# Patient Record
Sex: FEMALE | Race: WHITE
[De-identification: names, ages, dates, MRNs, and addresses within clinical notes are randomized per-mention and may not be internally consistent; named-entity substitution may affect disease eponyms.]

---

## 2021-03-24 ENCOUNTER — HOSPITAL ENCOUNTER (EMERGENCY)
Dept: HOSPITAL 75 - ER FS | Age: 21
LOS: 1 days | Discharge: ANOTHER HOSPITAL | End: 2021-03-25
Payer: SELF-PAY

## 2021-03-24 VITALS — BODY MASS INDEX: 19.94 KG/M2 | WEIGHT: 88.63 LBS | HEIGHT: 55.98 IN

## 2021-03-24 DIAGNOSIS — Z20.822: ICD-10-CM

## 2021-03-24 DIAGNOSIS — R45.851: Primary | ICD-10-CM

## 2021-03-24 LAB
ALBUMIN SERPL-MCNC: 4.2 GM/DL (ref 3.2–4.5)
ALP SERPL-CCNC: 64 U/L (ref 40–136)
ALT SERPL-CCNC: 7 U/L (ref 0–55)
APAP SERPL-MCNC: < 10 UG/ML (ref 10–30)
APTT PPP: YELLOW S
BACTERIA #/AREA URNS HPF: (no result) /HPF
BARBITURATES UR QL: NEGATIVE
BASOPHILS # BLD AUTO: 0.1 10^3/UL (ref 0–0.1)
BASOPHILS NFR BLD AUTO: 1 % (ref 0–10)
BENZODIAZ UR QL SCN: NEGATIVE
BILIRUB SERPL-MCNC: 0.4 MG/DL (ref 0.1–1)
BILIRUB UR QL STRIP: NEGATIVE
BUN/CREAT SERPL: 18
CALCIUM SERPL-MCNC: 8.8 MG/DL (ref 8.5–10.1)
CHLORIDE SERPL-SCNC: 110 MMOL/L (ref 98–107)
CO2 SERPL-SCNC: 23 MMOL/L (ref 21–32)
COCAINE UR QL: NEGATIVE
CREAT SERPL-MCNC: 0.62 MG/DL (ref 0.6–1.3)
EOSINOPHIL # BLD AUTO: 0.2 10^3/UL (ref 0–0.3)
EOSINOPHIL NFR BLD AUTO: 3 % (ref 0–10)
FIBRINOGEN PPP-MCNC: (no result) MG/DL
GFR SERPLBLD BASED ON 1.73 SQ M-ARVRAT: > 60 ML/MIN
GLUCOSE SERPL-MCNC: 85 MG/DL (ref 70–105)
GLUCOSE UR STRIP-MCNC: NEGATIVE MG/DL
HCG UR QL: NEGATIVE
HCT VFR BLD CALC: 35 % (ref 35–52)
HGB BLD-MCNC: 12.3 G/DL (ref 11.5–16)
KETONES UR QL STRIP: NEGATIVE
LEUKOCYTE ESTERASE UR QL STRIP: (no result)
LYMPHOCYTES # BLD AUTO: 1.7 X 10^3 (ref 1–4)
LYMPHOCYTES NFR BLD AUTO: 26 % (ref 12–44)
MANUAL DIFFERENTIAL PERFORMED BLD QL: NO
MCH RBC QN AUTO: 32 PG (ref 25–34)
MCHC RBC AUTO-ENTMCNC: 35 G/DL (ref 32–36)
MCV RBC AUTO: 91 FL (ref 80–99)
METHADONE UR QL SCN: NEGATIVE
METHAMPHETAMINE SCREEN URINE S: NEGATIVE
MONOCYTES # BLD AUTO: 0.4 X 10^3 (ref 0–1)
MONOCYTES NFR BLD AUTO: 7 % (ref 0–12)
NEUTROPHILS # BLD AUTO: 4.1 X 10^3 (ref 1.8–7.8)
NEUTROPHILS NFR BLD AUTO: 64 % (ref 42–75)
NITRITE UR QL STRIP: NEGATIVE
OPIATES UR QL SCN: NEGATIVE
OXYCODONE UR QL: NEGATIVE
PH UR STRIP: 8.5 [PH] (ref 5–9)
PLATELET # BLD: 276 10^3/UL (ref 130–400)
PMV BLD AUTO: 10 FL (ref 7.4–10.4)
POTASSIUM SERPL-SCNC: 3.9 MMOL/L (ref 3.6–5)
PROPOXYPH UR QL: NEGATIVE
PROT SERPL-MCNC: 6.5 GM/DL (ref 6.4–8.2)
PROT UR QL STRIP: (no result)
RBC #/AREA URNS HPF: (no result) /HPF
SALICYLATES SERPL-MCNC: < 0.3 MG/DL (ref 5–20)
SODIUM SERPL-SCNC: 142 MMOL/L (ref 135–145)
SP GR UR STRIP: 1.02 (ref 1.02–1.02)
TRICYCLICS UR QL SCN: NEGATIVE
WBC # BLD AUTO: 6.4 10^3/UL (ref 4.3–11)

## 2021-03-24 PROCEDURE — 81000 URINALYSIS NONAUTO W/SCOPE: CPT

## 2021-03-24 PROCEDURE — 80053 COMPREHEN METABOLIC PANEL: CPT

## 2021-03-24 PROCEDURE — 93005 ELECTROCARDIOGRAM TRACING: CPT

## 2021-03-24 PROCEDURE — 85025 COMPLETE CBC W/AUTO DIFF WBC: CPT

## 2021-03-24 PROCEDURE — 87088 URINE BACTERIA CULTURE: CPT

## 2021-03-24 PROCEDURE — 80320 DRUG SCREEN QUANTALCOHOLS: CPT

## 2021-03-24 PROCEDURE — 87635 SARS-COV-2 COVID-19 AMP PRB: CPT

## 2021-03-24 PROCEDURE — 93041 RHYTHM ECG TRACING: CPT

## 2021-03-24 PROCEDURE — 80329 ANALGESICS NON-OPIOID 1 OR 2: CPT

## 2021-03-24 PROCEDURE — 36415 COLL VENOUS BLD VENIPUNCTURE: CPT

## 2021-03-24 PROCEDURE — 99284 EMERGENCY DEPT VISIT MOD MDM: CPT

## 2021-03-24 PROCEDURE — 80306 DRUG TEST PRSMV INSTRMNT: CPT

## 2021-03-24 PROCEDURE — 84703 CHORIONIC GONADOTROPIN ASSAY: CPT
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
## 2021-03-25 VITALS — SYSTOLIC BLOOD PRESSURE: 116 MMHG | DIASTOLIC BLOOD PRESSURE: 72 MMHG

## 2021-04-07 ENCOUNTER — HOSPITAL ENCOUNTER (EMERGENCY)
Dept: HOSPITAL 75 - ER FS | Age: 21
Discharge: HOME | End: 2021-04-07
Payer: SELF-PAY

## 2021-04-07 VITALS — SYSTOLIC BLOOD PRESSURE: 124 MMHG | DIASTOLIC BLOOD PRESSURE: 62 MMHG

## 2021-04-07 VITALS — BODY MASS INDEX: 19.98 KG/M2 | WEIGHT: 92.59 LBS | HEIGHT: 57.09 IN

## 2021-04-07 DIAGNOSIS — T43.222A: ICD-10-CM

## 2021-04-07 DIAGNOSIS — F41.9: Primary | ICD-10-CM

## 2021-04-07 LAB
ALBUMIN SERPL-MCNC: 3.7 GM/DL (ref 3.2–4.5)
ALP SERPL-CCNC: 64 U/L (ref 40–136)
ALT SERPL-CCNC: 6 U/L (ref 0–55)
APAP SERPL-MCNC: < 10 UG/ML (ref 10–30)
APTT PPP: YELLOW S
BACTERIA #/AREA URNS HPF: (no result) /HPF
BARBITURATES UR QL: NEGATIVE
BASOPHILS # BLD AUTO: 0 10^3/UL (ref 0–0.1)
BASOPHILS NFR BLD AUTO: 0 % (ref 0–10)
BENZODIAZ UR QL SCN: NEGATIVE
BILIRUB SERPL-MCNC: 0.2 MG/DL (ref 0.1–1)
BILIRUB UR QL STRIP: NEGATIVE
BUN/CREAT SERPL: 17
CALCIUM SERPL-MCNC: 8.9 MG/DL (ref 8.5–10.1)
CHLORIDE SERPL-SCNC: 108 MMOL/L (ref 98–107)
CO2 SERPL-SCNC: 23 MMOL/L (ref 21–32)
COCAINE UR QL: NEGATIVE
CREAT SERPL-MCNC: 0.69 MG/DL (ref 0.6–1.3)
EOSINOPHIL # BLD AUTO: 0.2 10^3/UL (ref 0–0.3)
EOSINOPHIL NFR BLD AUTO: 3 % (ref 0–10)
FIBRINOGEN PPP-MCNC: CLEAR MG/DL
GFR SERPLBLD BASED ON 1.73 SQ M-ARVRAT: > 60 ML/MIN
GLUCOSE SERPL-MCNC: 88 MG/DL (ref 70–105)
GLUCOSE UR STRIP-MCNC: NEGATIVE MG/DL
HCT VFR BLD CALC: 35 % (ref 35–52)
HGB BLD-MCNC: 12.4 G/DL (ref 11.5–16)
KETONES UR QL STRIP: NEGATIVE
LEUKOCYTE ESTERASE UR QL STRIP: (no result)
LYMPHOCYTES # BLD AUTO: 1.3 X 10^3 (ref 1–4)
LYMPHOCYTES NFR BLD AUTO: 16 % (ref 12–44)
MANUAL DIFFERENTIAL PERFORMED BLD QL: NO
MCH RBC QN AUTO: 32 PG (ref 25–34)
MCHC RBC AUTO-ENTMCNC: 35 G/DL (ref 32–36)
MCV RBC AUTO: 92 FL (ref 80–99)
METHADONE UR QL SCN: NEGATIVE
METHAMPHETAMINE SCREEN URINE S: NEGATIVE
MONOCYTES # BLD AUTO: 0.7 X 10^3 (ref 0–1)
MONOCYTES NFR BLD AUTO: 9 % (ref 0–12)
NEUTROPHILS # BLD AUTO: 5.8 X 10^3 (ref 1.8–7.8)
NEUTROPHILS NFR BLD AUTO: 72 % (ref 42–75)
NITRITE UR QL STRIP: NEGATIVE
OPIATES UR QL SCN: NEGATIVE
OXYCODONE UR QL: NEGATIVE
PH UR STRIP: 6 [PH] (ref 5–9)
PLATELET # BLD: 227 10^3/UL (ref 130–400)
PMV BLD AUTO: 10.1 FL (ref 7.4–10.4)
POTASSIUM SERPL-SCNC: 3.9 MMOL/L (ref 3.6–5)
PROPOXYPH UR QL: NEGATIVE
PROT SERPL-MCNC: 6.3 GM/DL (ref 6.4–8.2)
PROT UR QL STRIP: (no result)
RBC #/AREA URNS HPF: (no result) /HPF
SALICYLATES SERPL-MCNC: < 0.3 MG/DL (ref 5–20)
SODIUM SERPL-SCNC: 140 MMOL/L (ref 135–145)
SP GR UR STRIP: 1.02 (ref 1.02–1.02)
SQUAMOUS #/AREA URNS HPF: (no result) /HPF
TRICYCLICS UR QL SCN: NEGATIVE
WBC # BLD AUTO: 8.1 10^3/UL (ref 4.3–11)
WBC #/AREA URNS HPF: (no result) /HPF

## 2021-04-07 PROCEDURE — 80320 DRUG SCREEN QUANTALCOHOLS: CPT

## 2021-04-07 PROCEDURE — 81000 URINALYSIS NONAUTO W/SCOPE: CPT

## 2021-04-07 PROCEDURE — 93005 ELECTROCARDIOGRAM TRACING: CPT

## 2021-04-07 PROCEDURE — 84703 CHORIONIC GONADOTROPIN ASSAY: CPT

## 2021-04-07 PROCEDURE — 80329 ANALGESICS NON-OPIOID 1 OR 2: CPT

## 2021-04-07 PROCEDURE — 93041 RHYTHM ECG TRACING: CPT

## 2021-04-07 PROCEDURE — 80053 COMPREHEN METABOLIC PANEL: CPT

## 2021-04-07 PROCEDURE — 80306 DRUG TEST PRSMV INSTRMNT: CPT

## 2021-04-07 PROCEDURE — 36415 COLL VENOUS BLD VENIPUNCTURE: CPT

## 2021-04-07 PROCEDURE — 99284 EMERGENCY DEPT VISIT MOD MDM: CPT

## 2021-04-07 PROCEDURE — 85025 COMPLETE CBC W/AUTO DIFF WBC: CPT
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
## 2021-08-23 ENCOUNTER — HOSPITAL ENCOUNTER (EMERGENCY)
Dept: HOSPITAL 75 - ER FS | Age: 21
LOS: 1 days | Discharge: PSYCHIATRIC OR SUBSTANCE USE FACILITY | End: 2021-08-24
Payer: SELF-PAY

## 2021-08-23 ENCOUNTER — HOSPITAL ENCOUNTER (EMERGENCY)
Dept: HOSPITAL 75 - ER FS | Age: 21
LOS: 31 days | Discharge: LEFT BEFORE DISCHARGE | End: 2021-09-23
Payer: COMMERCIAL

## 2021-08-23 VITALS — HEIGHT: 57.48 IN | WEIGHT: 93.26 LBS | BODY MASS INDEX: 19.84 KG/M2

## 2021-08-23 DIAGNOSIS — F40.10: Primary | ICD-10-CM

## 2021-08-23 DIAGNOSIS — F32.9: ICD-10-CM

## 2021-08-23 DIAGNOSIS — R45.851: Primary | ICD-10-CM

## 2021-08-23 DIAGNOSIS — Z20.822: ICD-10-CM

## 2021-08-23 LAB
ALBUMIN SERPL-MCNC: 4.4 GM/DL (ref 3.2–4.5)
ALP SERPL-CCNC: 70 U/L (ref 40–136)
ALT SERPL-CCNC: < 5 U/L (ref 0–55)
APAP SERPL-MCNC: < 10 UG/ML (ref 10–30)
APTT PPP: YELLOW S
BACTERIA #/AREA URNS HPF: NEGATIVE /HPF
BARBITURATES UR QL: NEGATIVE
BASOPHILS # BLD AUTO: 0.1 10^3/UL (ref 0–0.1)
BASOPHILS NFR BLD AUTO: 1 % (ref 0–10)
BENZODIAZ UR QL SCN: NEGATIVE
BILIRUB SERPL-MCNC: 0.5 MG/DL (ref 0.1–1)
BILIRUB UR QL STRIP: NEGATIVE
BUN/CREAT SERPL: 17
CALCIUM SERPL-MCNC: 9.3 MG/DL (ref 8.5–10.1)
CHLORIDE SERPL-SCNC: 104 MMOL/L (ref 98–107)
CO2 SERPL-SCNC: 23 MMOL/L (ref 21–32)
COCAINE UR QL: NEGATIVE
CREAT SERPL-MCNC: 0.69 MG/DL (ref 0.6–1.3)
EOSINOPHIL # BLD AUTO: 0.3 10^3/UL (ref 0–0.3)
EOSINOPHIL NFR BLD AUTO: 3 % (ref 0–10)
FIBRINOGEN PPP-MCNC: CLEAR MG/DL
GFR SERPLBLD BASED ON 1.73 SQ M-ARVRAT: 107 ML/MIN
GLUCOSE SERPL-MCNC: 88 MG/DL (ref 70–105)
GLUCOSE UR STRIP-MCNC: NEGATIVE MG/DL
HCG UR QL: NEGATIVE
HCT VFR BLD CALC: 38 % (ref 35–52)
HGB BLD-MCNC: 13.2 G/DL (ref 11.5–16)
KETONES UR QL STRIP: NEGATIVE
LEUKOCYTE ESTERASE UR QL STRIP: (no result)
LYMPHOCYTES # BLD AUTO: 2.6 X 10^3 (ref 1–4)
LYMPHOCYTES NFR BLD AUTO: 24 % (ref 12–44)
MANUAL DIFFERENTIAL PERFORMED BLD QL: NO
MCH RBC QN AUTO: 31 PG (ref 25–34)
MCHC RBC AUTO-ENTMCNC: 35 G/DL (ref 32–36)
MCV RBC AUTO: 91 FL (ref 80–99)
METHADONE UR QL SCN: NEGATIVE
METHAMPHETAMINE SCREEN URINE S: NEGATIVE
MONOCYTES # BLD AUTO: 0.6 X 10^3 (ref 0–1)
MONOCYTES NFR BLD AUTO: 6 % (ref 0–12)
NEUTROPHILS # BLD AUTO: 7.2 X 10^3 (ref 1.8–7.8)
NEUTROPHILS NFR BLD AUTO: 67 % (ref 42–75)
NITRITE UR QL STRIP: NEGATIVE
OPIATES UR QL SCN: NEGATIVE
OXYCODONE UR QL: NEGATIVE
PH UR STRIP: 7 [PH] (ref 5–9)
PLATELET # BLD: 287 10^3/UL (ref 130–400)
PMV BLD AUTO: 10.2 FL (ref 9–12.2)
POTASSIUM SERPL-SCNC: 4 MMOL/L (ref 3.6–5)
PROPOXYPH UR QL: NEGATIVE
PROT SERPL-MCNC: 7.2 GM/DL (ref 6.4–8.2)
PROT UR QL STRIP: (no result)
RBC #/AREA URNS HPF: (no result) /HPF
SALICYLATES SERPL-MCNC: < 0.3 MG/DL (ref 5–20)
SODIUM SERPL-SCNC: 137 MMOL/L (ref 135–145)
SP GR UR STRIP: 1.02 (ref 1.02–1.02)
SQUAMOUS #/AREA URNS HPF: (no result) /HPF
TRICYCLICS UR QL SCN: NEGATIVE
WBC # BLD AUTO: 10.8 10^3/UL (ref 4.3–11)
WBC #/AREA URNS HPF: (no result) /HPF

## 2021-08-23 PROCEDURE — 80053 COMPREHEN METABOLIC PANEL: CPT

## 2021-08-23 PROCEDURE — 80320 DRUG SCREEN QUANTALCOHOLS: CPT

## 2021-08-23 PROCEDURE — 81000 URINALYSIS NONAUTO W/SCOPE: CPT

## 2021-08-23 PROCEDURE — 36415 COLL VENOUS BLD VENIPUNCTURE: CPT

## 2021-08-23 PROCEDURE — 87636 SARSCOV2 & INF A&B AMP PRB: CPT

## 2021-08-23 PROCEDURE — 80329 ANALGESICS NON-OPIOID 1 OR 2: CPT

## 2021-08-23 PROCEDURE — 80306 DRUG TEST PRSMV INSTRMNT: CPT

## 2021-08-23 PROCEDURE — 84703 CHORIONIC GONADOTROPIN ASSAY: CPT

## 2021-08-23 PROCEDURE — 85025 COMPLETE CBC W/AUTO DIFF WBC: CPT

## 2021-08-23 PROCEDURE — 99284 EMERGENCY DEPT VISIT MOD MDM: CPT
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
## 2021-08-24 VITALS — SYSTOLIC BLOOD PRESSURE: 112 MMHG | DIASTOLIC BLOOD PRESSURE: 62 MMHG

## 2021-10-17 NOTE — XMS REPORT
Encounter Summary

                             Created on: 10/17/2021



Nellie Rubin

External Reference #: [id number]

: 2000

Sex: Female



Demographics





                          Address                   [address] [place]  [address]

 

                          Home Phone                [phone]

 

                          Preferred Language        English

 

                          Marital Status            Single

 

                          Spiritism Affiliation     Unknown

 

                          Race                      White

 

                          Ethnic Group              Not  or 





Author





                          Author                    [facility]

 

                          Organization              [facility]

 

                          Address                   Unknown

 

                          Phone                     Unavailable







Support





                Name            Relationship    Address         Phone

 

                    Juana Rubin         [employer]                [address] [place]  [address]                   [phone]







Care Team Providers





                    Care Team Member Name Role                Phone

 

                    Unassigned, None    PCP                 Unavailable

 

                    Kitty Merino  [phone]           [phone]







Encounter Details





                          Care Team                 Description



                     Date                Type                Department  

 

                                        



Kitty Merino



[phone] (Work)                      



                     2021          Patient             [facility] 

 



                           Outreach                  Management  



                                         [address] [place] [address]  



                                         [phone]  







Social History





                                        Date



                 Tobacco Use     Types           Packs/Day       Years Used 

 

                                         



                                         Never Smoker    

 

    



                                         Smokeless Tobacco: Never   



                                         Used   







                                        Comments



                           Alcohol Use               Standard Drinks/Week 

 

                                        occasional use



                           Yes                       0 (1 standard drink = 0.6 o

z pure alcohol) 







  



                     Alcohol Habits      Answer              Date Recorded

 

  



                     How often do you have a drink containing alcohol?  2-4 time

s a month   

2021

 

  



                     How many drinks containing alcohol do you have on  3 or 4  

            2021



                                         a typical day when you are drinking?  

 

  



                     How often do you have six or more drinks on one  Monthly   

          2021



                                         occasion?  







  



                     Social Isolation    Answer              Date Recorded

 

  



                     In a typical week, how many times do you talk on  Never    

           2021



                                         the phone with family, friends, or neig

hbors?  

 

  



                     How often do you get together with friends or  Once a week 

        2021



                                         relatives?  

 

  



                     How often do you attend Adventism or Buddhism  Never         

      2021



                                         services?  

 

  



                     Do you belong to any clubs or organizations such  No       

           2021



                                         as Adventism groups, unions, fraternal or 

athletic  



                                         groups, or school groups?  

 

  



                     How often do you attend meetings of the clubs or  Never    

           2021



                                         organizations you belong to?  

 

  



                     Are you now , , , ,  Never m

arried       2021



                                         never  or living with a partner?

  







  



                     Physical Activity   Answer              Date Recorded

 

  



                     On average, how many days per week do you engage  4 days   

           2021



                                         in moderate to strenuous exercise (like

 walking  



                                         fast, running, jogging, dancing, swimmi

ng, biking,  



                                         or other activities that cause a light 

or heavy  



                                         sweat)?  

 

  



                     On average, how many minutes do you engage in  120 min     

        2021



                                         exercise at this level?  







  



                     Stress              Answer              Date Recorded

 

  



                     Do you feel stress - tense, restless, nervous, or  Only a l

ittle       2021



                                         anxious, or unable to sleep at night be

cause your  



                                         mind is troubled all the time - these d

ays?  







  



                     Financial Resource Strain  Answer              Date Recorde

d

 

  



                     How hard is it for you to pay for the very basics  Very jessie

d           2021



                                         like food, housing, medical care, and h

eating?  







  



                     Intimate Partner Violence  Answer              Date Recorde

d

 

  



                     Within the last year, have you been afraid of your  No     

             2021



                                         partner or ex-partner?  

 

  



                     Within the last year, have you been humiliated or  No      

            2021



                                         emotionally abused in other ways by you

r partner  



                                         or ex-partner?  

 

  



                     Within the last year, have you been kicked, hit,  No       

           2021



                                         slapped, or otherwise physically hurt b

y your  



                                         partner or ex-partner?  

 

  



                     Within the last year, have you been raped or  No           

       2021



                                         forced to have any kind of sexual activ

ity by your  



                                         partner or ex-partner?  







  



                     Food Insecurity     Answer              Date Recorded

 

  



                     Within the past 12 months, you worried that your  Never meir

e          2021



                                         food would run out before you got money

 to buy  



                                         more.  

 

  



                     Within the past 12 months, the food you bought  Never true 

         2021



                                         just didn't last and you didn't have mo

chava to get  



                                         more.  







  



                     Transportation Needs  Answer              Date Recorded

 

  



                     In the past 12 months, has lack of transportation  No      

            2021



                                         kept you from medical appointments or f

rom getting  



                                         medications?  

 

  



                     In the past 12 months, has lack of transportation  No      

            2021



                                         kept you from meetings, work, or gettin

g things  



                                         needed for daily living?  







                    Birth Control       Partners            Comments



                                         Sexually Active   

 

                                                             



                                         Yes   







 



                           Sex Assigned at Birth     Date Recorded

 

 



                                         Not on file 







                                        Industry



                           Job Start Date            Occupation 

 

                                        Not on file



                           Not on file               Not on file 







                                        Date Recorded



                           COVID-19 Exposure         Response 

 

                                        2021 11:39 PM CDT



                           In the last month, have you been in contact with  No 

/ Unsure 



                                         someone who was confirmed or suspected 

to have  



                                         Coronavirus / COVID-19?  



documented as of this encounter



Plan of Treatment





Not on filedocumented as of this encounter



Visit Diagnoses

Not on filedocumented in this encounter



Additional Health Concerns





                                        Noted Time



                                         Assessment 

 

                                        2021  8:20 AM CDT



                                         A fall risk assessment has been complet

ed for the 



                                         patient 



documented as of this encounter



Care Teams





                          Start Date                End Date



                     Team Member         Relationship        Specialty  

 

                          21                    



                           Unassigned, None          PCP - General   



                                         [place]    

 

                          21



                     Kitty Merino         Social Work  



                                         [phone] (Work)    



documented as of this encounter

## 2021-10-17 NOTE — XMS REPORT
Encounter Summary

                             Created on: 10/17/2021



Nellie Rubin

External Reference #: [id number]

: 2000

Sex: Female



Demographics





                          Address                   [address] [place]  [address]

 

                          Home Phone                [phone]

 

                          Preferred Language        English

 

                          Marital Status            Single

 

                          Bahai Affiliation     Unknown

 

                          Race                      White

 

                          Ethnic Group              Not  or 





Author





                          Author                    [facility]

 

                          Organization              [facility]

 

                          Address                   Unknown

 

                          Phone                     Unavailable







Support





                Name            Relationship    Address         Phone

 

                    Juana Rubin         [employer]                [address] [place]  [address]                   [phone]







Care Team Providers





                    Care Team Member Name Role                Phone

 

                          PCP                       Unavailable







Encounter Details





                          Care Team                 Description



                     Date                Type                Department  

 

                                                     



                           2021                Travel   







Social History





                                        Date



                 Tobacco Use     Types           Packs/Day       Years Used 

 

                                         



                                         Never Assessed    







  



                     Alcohol Habits      Answer              Date Recorded

 

  



                     How often do you have a drink containing alcohol?  2-4 time

s a month   

2021

 

  



                     How many drinks containing alcohol do you have on  3 or 4  

            2021



                                         a typical day when you are drinking?  

 

  



                     How often do you have six or more drinks on one  Monthly   

          2021



                                         occasion?  







  



                     Social Isolation    Answer              Date Recorded

 

  



                     In a typical week, how many times do you talk on  Never    

           2021



                                         the phone with family, friends, or neig

hbors?  

 

  



                     How often do you get together with friends or  Once a week 

        2021



                                         relatives?  

 

  



                     How often do you attend Jew or Adventist  Never         

      2021



                                         services?  

 

  



                     Do you belong to any clubs or organizations such  No       

           2021



                                         as Jew groups, unions, fraternal or 

athletic  



                                         groups, or school groups?  

 

  



                     How often do you attend meetings of the clubs or  Never    

           2021



                                         organizations you belong to?  

 

  



                     Are you now , , , ,  Never m

arried       2021



                                         never  or living with a partner?

  







  



                     Physical Activity   Answer              Date Recorded

 

  



                     On average, how many days per week do you engage  4 days   

           2021



                                         in moderate to strenuous exercise (like

 walking  



                                         fast, running, jogging, dancing, swimmi

ng, biking,  



                                         or other activities that cause a light 

or heavy  



                                         sweat)?  

 

  



                     On average, how many minutes do you engage in  120 min     

        2021



                                         exercise at this level?  







  



                     Stress              Answer              Date Recorded

 

  



                     Do you feel stress - tense, restless, nervous, or  Only a l

ittle       2021



                                         anxious, or unable to sleep at night be

cause your  



                                         mind is troubled all the time - these d

ays?  







  



                     Financial Resource Strain  Answer              Date Recorde

d

 

  



                     How hard is it for you to pay for the very basics  Very jessie

d           2021



                                         like food, housing, medical care, and h

eating?  







  



                     Intimate Partner Violence  Answer              Date Recorde

d

 

  



                     Within the last year, have you been afraid of your  No     

             2021



                                         partner or ex-partner?  

 

  



                     Within the last year, have you been humiliated or  No      

            2021



                                         emotionally abused in other ways by you

r partner  



                                         or ex-partner?  

 

  



                     Within the last year, have you been kicked, hit,  No       

           2021



                                         slapped, or otherwise physically hurt b

y your  



                                         partner or ex-partner?  

 

  



                     Within the last year, have you been raped or  No           

       2021



                                         forced to have any kind of sexual activ

ity by your  



                                         partner or ex-partner?  







  



                     Food Insecurity     Answer              Date Recorded

 

  



                     Within the past 12 months, you worried that your  Never meir

e          2021



                                         food would run out before you got money

 to buy  



                                         more.  

 

  



                     Within the past 12 months, the food you bought  Never true 

         2021



                                         just didn't last and you didn't have mo

chava to get  



                                         more.  







  



                     Transportation Needs  Answer              Date Recorded

 

  



                     In the past 12 months, has lack of transportation  No      

            2021



                                         kept you from medical appointments or f

rom getting  



                                         medications?  

 

  



                     In the past 12 months, has lack of transportation  No      

            2021



                                         kept you from meetings, work, or gettin

g things  



                                         needed for daily living?  







 



                           Sex Assigned at Birth     Date Recorded

 

 



                                         Not on file 







                                        Industry



                           Job Start Date            Occupation 

 

                                        Not on file



                           Not on file               Not on file 







                                        Date Recorded



                           COVID-19 Exposure         Response 

 

                                        2021 11:39 PM CDT



                           In the last month, have you been in contact with  No 

/ Unsure 



                                         someone who was confirmed or suspected 

to have  



                                         Coronavirus / COVID-19?  



documented as of this encounter



Plan of Treatment





Not on filedocumented as of this encounter



Visit Diagnoses

Not on filedocumented in this encounter

## 2021-10-17 NOTE — XMS REPORT
Encounter Summary

                             Created on: 10/17/2021



Nellie Rubin

External Reference #: [id number]

: 2000

Sex: Female



Demographics





                          Address                   [address] [place]  [address]

 

                          Home Phone                [phone]

 

                          Preferred Language        English

 

                          Marital Status            Single

 

                          Sabianism Affiliation     Unknown

 

                          Race                      White

 

                          Ethnic Group              Not  or 





Author





                          Author                    [facility]

 

                          Organization              [facility]

 

                          Address                   Unknown

 

                          Phone                     Unavailable







Support





                Name            Relationship    Address         Phone

 

                    Juana Rubin         [employer]                [address] [place]  [address]                   [phone]







Care Team Providers





                    Care Team Member Name Role                Phone

 

                    Unassigned, None    PCP                 Unavailable

 

                    Kitty Merino  [phone]           [phone]







Encounter Details





                          Care Team                 Description



                     Date                Type                Department  

 

                                        



Kitty Merino



[phone] (Work)                      



                     2021          Patient             [facility] 

 



                           Outreach                  Management  



                                         [address] [place] [address]  



                                         [phone]  







Social History





                                        Date



                 Tobacco Use     Types           Packs/Day       Years Used 

 

                                         



                                         Never Smoker    

 

    



                                         Smokeless Tobacco: Never   



                                         Used   







                                        Comments



                           Alcohol Use               Standard Drinks/Week 

 

                                        occasional use



                           Yes                       0 (1 standard drink = 0.6 o

z pure alcohol) 







  



                     Alcohol Habits      Answer              Date Recorded

 

  



                     How often do you have a drink containing alcohol?  2-4 time

s a month   

2021

 

  



                     How many drinks containing alcohol do you have on  3 or 4  

            2021



                                         a typical day when you are drinking?  

 

  



                     How often do you have six or more drinks on one  Monthly   

          2021



                                         occasion?  







  



                     Social Isolation    Answer              Date Recorded

 

  



                     In a typical week, how many times do you talk on  Never    

           2021



                                         the phone with family, friends, or neig

hbors?  

 

  



                     How often do you get together with friends or  Once a week 

        2021



                                         relatives?  

 

  



                     How often do you attend Oriental orthodox or Denominational  Never         

      2021



                                         services?  

 

  



                     Do you belong to any clubs or organizations such  No       

           2021



                                         as Oriental orthodox groups, unions, fraternal or 

athletic  



                                         groups, or school groups?  

 

  



                     How often do you attend meetings of the clubs or  Never    

           2021



                                         organizations you belong to?  

 

  



                     Are you now , , , ,  Never m

arried       2021



                                         never  or living with a partner?

  







  



                     Physical Activity   Answer              Date Recorded

 

  



                     On average, how many days per week do you engage  4 days   

           2021



                                         in moderate to strenuous exercise (like

 walking  



                                         fast, running, jogging, dancing, swimmi

ng, biking,  



                                         or other activities that cause a light 

or heavy  



                                         sweat)?  

 

  



                     On average, how many minutes do you engage in  120 min     

        2021



                                         exercise at this level?  







  



                     Stress              Answer              Date Recorded

 

  



                     Do you feel stress - tense, restless, nervous, or  Only a l

ittle       2021



                                         anxious, or unable to sleep at night be

cause your  



                                         mind is troubled all the time - these d

ays?  







  



                     Financial Resource Strain  Answer              Date Recorde

d

 

  



                     How hard is it for you to pay for the very basics  Very jessie

d           2021



                                         like food, housing, medical care, and h

eating?  







  



                     Intimate Partner Violence  Answer              Date Recorde

d

 

  



                     Within the last year, have you been afraid of your  No     

             2021



                                         partner or ex-partner?  

 

  



                     Within the last year, have you been humiliated or  No      

            2021



                                         emotionally abused in other ways by you

r partner  



                                         or ex-partner?  

 

  



                     Within the last year, have you been kicked, hit,  No       

           2021



                                         slapped, or otherwise physically hurt b

y your  



                                         partner or ex-partner?  

 

  



                     Within the last year, have you been raped or  No           

       2021



                                         forced to have any kind of sexual activ

ity by your  



                                         partner or ex-partner?  







  



                     Food Insecurity     Answer              Date Recorded

 

  



                     Within the past 12 months, you worried that your  Never meir

e          2021



                                         food would run out before you got money

 to buy  



                                         more.  

 

  



                     Within the past 12 months, the food you bought  Never true 

         2021



                                         just didn't last and you didn't have mo

chava to get  



                                         more.  







  



                     Transportation Needs  Answer              Date Recorded

 

  



                     In the past 12 months, has lack of transportation  No      

            2021



                                         kept you from medical appointments or f

rom getting  



                                         medications?  

 

  



                     In the past 12 months, has lack of transportation  No      

            2021



                                         kept you from meetings, work, or gettin

g things  



                                         needed for daily living?  







                    Birth Control       Partners            Comments



                                         Sexually Active   

 

                                                             



                                         Yes   







 



                           Sex Assigned at Birth     Date Recorded

 

 



                                         Not on file 







                                        Industry



                           Job Start Date            Occupation 

 

                                        Not on file



                           Not on file               Not on file 







                                        Date Recorded



                           COVID-19 Exposure         Response 

 

                                        2021 11:39 PM CDT



                           In the last month, have you been in contact with  No 

/ Unsure 



                                         someone who was confirmed or suspected 

to have  



                                         Coronavirus / COVID-19?  



documented as of this encounter



Miscellaneous Notes

* Progress Notes - Kitty Merino - 2021  1:05 PM CDT



Formatting of this note might be different from the original.

Patient: Nellie Rubin    : 2000

PCP: Unassigned, None



Today's Date: 2021



Referral Received From: IP Discharge



Key Issue: Follow up 



Intervention: SW contacted Pt to see how she's been doing since discharge.  Pt s
tates she is doing "well" and does not have any SI/HI.  SW reminded Pt of her me
d check and therapy appointments at [facility] on 21 and 21, respectively. 
Pt is living with her grandmother, so she is not in need of any services.  SW a
sked if she wanted any help finding a PCP and Pt stated she did not.  Pt stated 
her meds are working a she is doing well.



Plan: SW will follow up with Pt and be available as needed



Kitty Merino LMSW 

2021 1:05 PM



Electronically signed by Kitty Merino at 2021  1:15 PM CDT

documented in this encounter



Plan of Treatment





Not on filedocumented as of this encounter



Visit Diagnoses

Not on filedocumented in this encounter



Additional Health Concerns





                                        Noted Time



                                         Assessment 

 

                                        2021  8:20 AM CDT



                                         A fall risk assessment has been complet

ed for the 



                                         patient 



documented as of this encounter



Care Teams





                          Start Date                End Date



                     Team Member         Relationship        Specialty  

 

                          21                    



                           Unassigned, None          PCP - General   



                                         [place]    

 

                          21



                     Kitty Merino         Social Work  



                                         [phone] (Work)    



documented as of this encounter

## 2021-10-17 NOTE — XMS REPORT
Encounter Summary

                             Created on: 10/17/2021



Nellie Rubin

External Reference #: [id number]

: 2000

Sex: Female



Demographics





                          Address                   [address] [place]  [address]

 

                          Home Phone                [phone]

 

                          Preferred Language        English

 

                          Marital Status            Single

 

                          Jehovah's witness Affiliation     Unknown

 

                          Race                      White

 

                          Ethnic Group              Not  or 





Author





                          Author                    [facility]

 

                          Organization              [facility]

 

                          Address                   Unknown

 

                          Phone                     Unavailable







Support





                Name            Relationship    Address         Phone

 

                    Juana Rubin         [employer]                [address] [place]  [address]                   [phone]







Care Team Providers





                    Care Team Member Name Role                Phone

 

                    Unassigned, None    PCP                 Unavailable

 

                    Kitty Merino  [phone]           [phone]







Encounter Details





                          Care Team                 Description



                     Date                Type                Department  

 

                                        



Kitty Merino



[phone] (Work)                      



                     2021          Patient             [facility] 

 



                           Outreach                  Management  



                                         [address] [place] [address]  



                                         [phone]  







Social History





                                        Date



                 Tobacco Use     Types           Packs/Day       Years Used 

 

                                         



                                         Never Smoker    

 

    



                                         Smokeless Tobacco: Never   



                                         Used   







                                        Comments



                           Alcohol Use               Standard Drinks/Week 

 

                                        occasional use



                           Yes                       0 (1 standard drink = 0.6 o

z pure alcohol) 







  



                     Alcohol Habits      Answer              Date Recorded

 

  



                     How often do you have a drink containing alcohol?  2-4 time

s a month   

2021

 

  



                     How many drinks containing alcohol do you have on  3 or 4  

            2021



                                         a typical day when you are drinking?  

 

  



                     How often do you have six or more drinks on one  Monthly   

          2021



                                         occasion?  







  



                     Social Isolation    Answer              Date Recorded

 

  



                     In a typical week, how many times do you talk on  Never    

           2021



                                         the phone with family, friends, or neig

hbors?  

 

  



                     How often do you get together with friends or  Once a week 

        2021



                                         relatives?  

 

  



                     How often do you attend Taoism or Yazidi  Never         

      2021



                                         services?  

 

  



                     Do you belong to any clubs or organizations such  No       

           2021



                                         as Taoism groups, unions, fraternal or 

athletic  



                                         groups, or school groups?  

 

  



                     How often do you attend meetings of the clubs or  Never    

           2021



                                         organizations you belong to?  

 

  



                     Are you now , , , ,  Never m

arried       2021



                                         never  or living with a partner?

  







  



                     Physical Activity   Answer              Date Recorded

 

  



                     On average, how many days per week do you engage  4 days   

           2021



                                         in moderate to strenuous exercise (like

 walking  



                                         fast, running, jogging, dancing, swimmi

ng, biking,  



                                         or other activities that cause a light 

or heavy  



                                         sweat)?  

 

  



                     On average, how many minutes do you engage in  120 min     

        2021



                                         exercise at this level?  







  



                     Stress              Answer              Date Recorded

 

  



                     Do you feel stress - tense, restless, nervous, or  Only a l

ittle       2021



                                         anxious, or unable to sleep at night be

cause your  



                                         mind is troubled all the time - these d

ays?  







  



                     Financial Resource Strain  Answer              Date Recorde

d

 

  



                     How hard is it for you to pay for the very basics  Very jessie

d           2021



                                         like food, housing, medical care, and h

eating?  







  



                     Intimate Partner Violence  Answer              Date Recorde

d

 

  



                     Within the last year, have you been afraid of your  No     

             2021



                                         partner or ex-partner?  

 

  



                     Within the last year, have you been humiliated or  No      

            2021



                                         emotionally abused in other ways by you

r partner  



                                         or ex-partner?  

 

  



                     Within the last year, have you been kicked, hit,  No       

           2021



                                         slapped, or otherwise physically hurt b

y your  



                                         partner or ex-partner?  

 

  



                     Within the last year, have you been raped or  No           

       2021



                                         forced to have any kind of sexual activ

ity by your  



                                         partner or ex-partner?  







  



                     Food Insecurity     Answer              Date Recorded

 

  



                     Within the past 12 months, you worried that your  Never meir

e          2021



                                         food would run out before you got money

 to buy  



                                         more.  

 

  



                     Within the past 12 months, the food you bought  Never true 

         2021



                                         just didn't last and you didn't have mo

chava to get  



                                         more.  







  



                     Transportation Needs  Answer              Date Recorded

 

  



                     In the past 12 months, has lack of transportation  No      

            2021



                                         kept you from medical appointments or f

rom getting  



                                         medications?  

 

  



                     In the past 12 months, has lack of transportation  No      

            2021



                                         kept you from meetings, work, or gettin

g things  



                                         needed for daily living?  







                    Birth Control       Partners            Comments



                                         Sexually Active   

 

                                                             



                                         Yes   







 



                           Sex Assigned at Birth     Date Recorded

 

 



                                         Not on file 







                                        Industry



                           Job Start Date            Occupation 

 

                                        Not on file



                           Not on file               Not on file 







                                        Date Recorded



                           COVID-19 Exposure         Response 

 

                                        2021 11:39 PM CDT



                           In the last month, have you been in contact with  No 

/ Unsure 



                                         someone who was confirmed or suspected 

to have  



                                         Coronavirus / COVID-19?  



documented as of this encounter



Miscellaneous Notes

* Progress Notes - Kitty Merino - 2021  9:13 AM CDT



Formatting of this note might be different from the original.

Patient: Nellie Rubin    : 2000

PCP: Unassigned, None



Today's Date: 2021



Key Issue: Follow up 



Intervention:  SW has not had contact with Pt since 21.  SW sending closing
letter



Plan: SW will be available if needed



Kitty Merino LMSW 

2021 9:13 AM



Electronically signed by Kitty Merino at 2021  9:16 AM CDT

documented in this encounter



Plan of Treatment





Not on filedocumented as of this encounter



Visit Diagnoses

Not on filedocumented in this encounter



Additional Health Concerns





                                        Noted Time



                                         Assessment 

 

                                        2021  8:20 AM CDT



                                         A fall risk assessment has been complet

ed for the 



                                         patient 



documented as of this encounter



Care Teams





                          Start Date                End Date



                     Team Member         Relationship        Specialty  

 

                          21                    



                           Unassigned, None          PCP - General   



                                         [place]    

 

                          21



                     Kitty Merino         Social Work  



                                         [phone] (Work)    



documented as of this encounter

## 2021-10-17 NOTE — XMS REPORT
Encounter Summary

                             Created on: 10/17/2021



Nellie Rubin

External Reference #: [id number]

: 2000

Sex: Female



Demographics





                          Address                   [address] [place]  [address]

 

                          Home Phone                [phone]

 

                          Preferred Language        English

 

                          Marital Status            Single

 

                          Nondenominational Affiliation     Unknown

 

                          Race                      White

 

                          Ethnic Group              Not  or 





Author





                          Author                    [facility]

 

                          Organization              [facility]

 

                          Address                   Unknown

 

                          Phone                     Unavailable







Support





                Name            Relationship    Address         Phone

 

                    Juana Rubin         [employer]                [address] [place]  [address]                   [phone]







Care Team Providers





                    Care Team Member Name Role                Phone

 

                    Unassigned, None    PCP                 Unavailable

 

                    Kitty Merino  [phone]           [phone]







Encounter Details





                          Care Team                 Description



                     Date                Type                Department  

 

                                        



Kitty Merino



[phone] (Work)                      



                     2021          Patient             [facility] 

 



                           Outreach                  Management  



                                         [address] [place] [address]  



                                         [phone]  







Social History





                                        Date



                 Tobacco Use     Types           Packs/Day       Years Used 

 

                                         



                                         Never Smoker    

 

    



                                         Smokeless Tobacco: Never   



                                         Used   







                                        Comments



                           Alcohol Use               Standard Drinks/Week 

 

                                        occasional use



                           Yes                       0 (1 standard drink = 0.6 o

z pure alcohol) 







  



                     Alcohol Habits      Answer              Date Recorded

 

  



                     How often do you have a drink containing alcohol?  2-4 time

s a month   

2021

 

  



                     How many drinks containing alcohol do you have on  3 or 4  

            2021



                                         a typical day when you are drinking?  

 

  



                     How often do you have six or more drinks on one  Monthly   

          2021



                                         occasion?  







  



                     Social Isolation    Answer              Date Recorded

 

  



                     In a typical week, how many times do you talk on  Never    

           2021



                                         the phone with family, friends, or neig

hbors?  

 

  



                     How often do you get together with friends or  Once a week 

        2021



                                         relatives?  

 

  



                     How often do you attend Restorationist or Yazdanism  Never         

      2021



                                         services?  

 

  



                     Do you belong to any clubs or organizations such  No       

           2021



                                         as Restorationist groups, unions, fraternal or 

athletic  



                                         groups, or school groups?  

 

  



                     How often do you attend meetings of the clubs or  Never    

           2021



                                         organizations you belong to?  

 

  



                     Are you now , , , ,  Never m

arried       2021



                                         never  or living with a partner?

  







  



                     Physical Activity   Answer              Date Recorded

 

  



                     On average, how many days per week do you engage  4 days   

           2021



                                         in moderate to strenuous exercise (like

 walking  



                                         fast, running, jogging, dancing, swimmi

ng, biking,  



                                         or other activities that cause a light 

or heavy  



                                         sweat)?  

 

  



                     On average, how many minutes do you engage in  120 min     

        2021



                                         exercise at this level?  







  



                     Stress              Answer              Date Recorded

 

  



                     Do you feel stress - tense, restless, nervous, or  Only a l

ittle       2021



                                         anxious, or unable to sleep at night be

cause your  



                                         mind is troubled all the time - these d

ays?  







  



                     Financial Resource Strain  Answer              Date Recorde

d

 

  



                     How hard is it for you to pay for the very basics  Very jessie

d           2021



                                         like food, housing, medical care, and h

eating?  







  



                     Intimate Partner Violence  Answer              Date Recorde

d

 

  



                     Within the last year, have you been afraid of your  No     

             2021



                                         partner or ex-partner?  

 

  



                     Within the last year, have you been humiliated or  No      

            2021



                                         emotionally abused in other ways by you

r partner  



                                         or ex-partner?  

 

  



                     Within the last year, have you been kicked, hit,  No       

           2021



                                         slapped, or otherwise physically hurt b

y your  



                                         partner or ex-partner?  

 

  



                     Within the last year, have you been raped or  No           

       2021



                                         forced to have any kind of sexual activ

ity by your  



                                         partner or ex-partner?  







  



                     Food Insecurity     Answer              Date Recorded

 

  



                     Within the past 12 months, you worried that your  Never meir

e          2021



                                         food would run out before you got money

 to buy  



                                         more.  

 

  



                     Within the past 12 months, the food you bought  Never true 

         2021



                                         just didn't last and you didn't have mo

chava to get  



                                         more.  







  



                     Transportation Needs  Answer              Date Recorded

 

  



                     In the past 12 months, has lack of transportation  No      

            2021



                                         kept you from medical appointments or f

rom getting  



                                         medications?  

 

  



                     In the past 12 months, has lack of transportation  No      

            2021



                                         kept you from meetings, work, or gettin

g things  



                                         needed for daily living?  







                    Birth Control       Partners            Comments



                                         Sexually Active   

 

                                                             



                                         Yes   







 



                           Sex Assigned at Birth     Date Recorded

 

 



                                         Not on file 







                                        Industry



                           Job Start Date            Occupation 

 

                                        Not on file



                           Not on file               Not on file 







                                        Date Recorded



                           COVID-19 Exposure         Response 

 

                                        2021 11:39 PM CDT



                           In the last month, have you been in contact with  No 

/ Unsure 



                                         someone who was confirmed or suspected 

to have  



                                         Coronavirus / COVID-19?  



documented as of this encounter



Plan of Treatment





Not on filedocumented as of this encounter



Visit Diagnoses

Not on filedocumented in this encounter



Additional Health Concerns





                                        Noted Time



                                         Assessment 

 

                                        2021  8:20 AM CDT



                                         A fall risk assessment has been complet

ed for the 



                                         patient 



documented as of this encounter



Care Teams





                          Start Date                End Date



                     Team Member         Relationship        Specialty  

 

                          21                    



                           Unassigned, None          PCP - General   



                                         [place]    

 

                          21



                     Kitty Merino         Social Work  



                                         [phone] (Work)    



documented as of this encounter

## 2021-10-17 NOTE — XMS REPORT
Encounter Summary

                             Created on: 10/17/2021



Nellie Rubin

External Reference #: [id number]

: 2000

Sex: Female



Demographics





                          Address                   [address] [place]  [address]

 

                          Home Phone                [phone]

 

                          Preferred Language        English

 

                          Marital Status            Single

 

                          Advent Affiliation     Unknown

 

                          Race                      White

 

                          Ethnic Group              Not  or 





Author





                          Author                    [facility]

 

                          Organization              [facility]

 

                          Address                   Unknown

 

                          Phone                     Unavailable







Support





                Name            Relationship    Address         Phone

 

                    Juana Rubin         [employer]                [address] [place]  [address]                   [phone]







Care Team Providers





                    Care Team Member Name Role                Phone

 

                    Unassigned, None    PCP                 Unavailable







Encounter Details





                          Care Team                 Description



                     Date                Type                Department  

 

                                        



Kitty Merino



[phone] (Work)                      



                     10/01/2021          Patient             [facility] 

 



                           Outreach                  Management  



                                         [address] [place] [address]  



                                         [phone]  







Social History





                                        Date



                 Tobacco Use     Types           Packs/Day       Years Used 

 

                                         



                                         Never Smoker    

 

    



                                         Smokeless Tobacco: Never   



                                         Used   







                                        Comments



                           Alcohol Use               Standard Drinks/Week 

 

                                        occasional use



                           Yes                       0 (1 standard drink = 0.6 o

z pure alcohol) 







  



                     Alcohol Habits      Answer              Date Recorded

 

  



                     How often do you have a drink containing alcohol?  2-4 time

s a month   

2021

 

  



                     How many drinks containing alcohol do you have on  3 or 4  

            2021



                                         a typical day when you are drinking?  

 

  



                     How often do you have six or more drinks on one  Monthly   

          2021



                                         occasion?  







  



                     Social Isolation    Answer              Date Recorded

 

  



                     In a typical week, how many times do you talk on  Never    

           2021



                                         the phone with family, friends, or neig

hbors?  

 

  



                     How often do you get together with friends or  Once a week 

        2021



                                         relatives?  

 

  



                     How often do you attend Denominational or Yazdanism  Never         

      2021



                                         services?  

 

  



                     Do you belong to any clubs or organizations such  No       

           2021



                                         as Denominational groups, unions, fraternal or 

athletic  



                                         groups, or school groups?  

 

  



                     How often do you attend meetings of the clubs or  Never    

           2021



                                         organizations you belong to?  

 

  



                     Are you now , , , ,  Never m

arried       2021



                                         never  or living with a partner?

  







  



                     Physical Activity   Answer              Date Recorded

 

  



                     On average, how many days per week do you engage  4 days   

           2021



                                         in moderate to strenuous exercise (like

 walking  



                                         fast, running, jogging, dancing, swimmi

ng, biking,  



                                         or other activities that cause a light 

or heavy  



                                         sweat)?  

 

  



                     On average, how many minutes do you engage in  120 min     

        2021



                                         exercise at this level?  







  



                     Stress              Answer              Date Recorded

 

  



                     Do you feel stress - tense, restless, nervous, or  Only a l

ittle       2021



                                         anxious, or unable to sleep at night be

cause your  



                                         mind is troubled all the time - these d

ays?  







  



                     Financial Resource Strain  Answer              Date Recorde

d

 

  



                     How hard is it for you to pay for the very basics  Very jessie

d           2021



                                         like food, housing, medical care, and h

eating?  







  



                     Intimate Partner Violence  Answer              Date Recorde

d

 

  



                     Within the last year, have you been afraid of your  No     

             2021



                                         partner or ex-partner?  

 

  



                     Within the last year, have you been humiliated or  No      

            2021



                                         emotionally abused in other ways by you

r partner  



                                         or ex-partner?  

 

  



                     Within the last year, have you been kicked, hit,  No       

           2021



                                         slapped, or otherwise physically hurt b

y your  



                                         partner or ex-partner?  

 

  



                     Within the last year, have you been raped or  No           

       2021



                                         forced to have any kind of sexual activ

ity by your  



                                         partner or ex-partner?  







  



                     Food Insecurity     Answer              Date Recorded

 

  



                     Within the past 12 months, you worried that your  Never meir

e          2021



                                         food would run out before you got money

 to buy  



                                         more.  

 

  



                     Within the past 12 months, the food you bought  Never true 

         2021



                                         just didn't last and you didn't have mo

chava to get  



                                         more.  







  



                     Transportation Needs  Answer              Date Recorded

 

  



                     In the past 12 months, has lack of transportation  No      

            2021



                                         kept you from medical appointments or f

rom getting  



                                         medications?  

 

  



                     In the past 12 months, has lack of transportation  No      

            2021



                                         kept you from meetings, work, or gettin

g things  



                                         needed for daily living?  







                    Birth Control       Partners            Comments



                                         Sexually Active   

 

                                                             



                                         Yes   







 



                           Sex Assigned at Birth     Date Recorded

 

 



                                         Not on file 







                                        Industry



                           Job Start Date            Occupation 

 

                                        Not on file



                           Not on file               Not on file 



documented as of this encounter



Miscellaneous Notes

* Progress Notes - Kitty Merino - 10/01/2021  9:25 AM CDT



Formatting of this note might be different from the original.

Patient: Nellie Rubin    : 2000

PCP: Unassigned, None



Today's Date: 10/1/2021



Intervention: No response from letter sent 21.  Removing Pt from panel



Plan: SW will be available if needed



Kitty Merino LMSW 

10/1/2021 9:25 AM



Electronically signed by Kitty Merino at 10/01/2021  9:27 AM CDT

documented in this encounter



Plan of Treatment





Not on filedocumented as of this encounter



Visit Diagnoses

Not on filedocumented in this encounter



Additional Health Concerns





                                        Noted Time



                                         Assessment 

 

                                        2021  8:20 AM CDT



                                         A fall risk assessment has been complet

ed for the 



                                         patient 



documented as of this encounter



Care Teams





                          Start Date                End Date



                     Team Member         Relationship        Specialty  

 

                          21                    



                           Unassigned, None          PCP - General   



                                         [place]    



documented as of this encounter